# Patient Record
Sex: FEMALE | URBAN - METROPOLITAN AREA
[De-identification: names, ages, dates, MRNs, and addresses within clinical notes are randomized per-mention and may not be internally consistent; named-entity substitution may affect disease eponyms.]

---

## 2018-01-17 ENCOUNTER — IMPORTED ENCOUNTER (OUTPATIENT)
Dept: URBAN - METROPOLITAN AREA CLINIC 59 | Facility: CLINIC | Age: 58
End: 2018-01-17

## 2018-01-17 PROBLEM — H02.534 EYELID RETRACTION OF LEFT UPPER EYELID: Noted: 2018-01-17

## 2018-01-17 PROBLEM — H05.89 OTHER DISORDERS OF ORBIT: Noted: 2018-01-17

## 2018-01-17 PROBLEM — H02.531 EYELID RETRACTION RIGHT UPPER EYELID: Noted: 2018-01-17

## 2018-01-17 PROBLEM — H04.123 TEAR FILM INSUFFICIENCY OF BILATERAL LACRIMAL GLANDS: Noted: 2018-01-17

## 2018-01-17 PROCEDURE — 99204 OFFICE O/P NEW MOD 45 MIN: CPT

## 2020-07-23 ENCOUNTER — APPOINTMENT (RX ONLY)
Dept: URBAN - METROPOLITAN AREA CLINIC 348 | Facility: CLINIC | Age: 60
Setting detail: DERMATOLOGY
End: 2020-07-23

## 2020-07-23 DIAGNOSIS — D22 MELANOCYTIC NEVI: ICD-10-CM

## 2020-07-23 DIAGNOSIS — Z80.8 FAMILY HISTORY OF MALIGNANT NEOPLASM OF OTHER ORGANS OR SYSTEMS: ICD-10-CM

## 2020-07-23 DIAGNOSIS — H01.13 ECZEMATOUS DERMATITIS OF EYELID: ICD-10-CM

## 2020-07-23 DIAGNOSIS — L82.1 OTHER SEBORRHEIC KERATOSIS: ICD-10-CM

## 2020-07-23 DIAGNOSIS — D18.0 HEMANGIOMA: ICD-10-CM

## 2020-07-23 DIAGNOSIS — L57.0 ACTINIC KERATOSIS: ICD-10-CM

## 2020-07-23 DIAGNOSIS — L81.4 OTHER MELANIN HYPERPIGMENTATION: ICD-10-CM

## 2020-07-23 PROBLEM — H01.134 ECZEMATOUS DERMATITIS OF LEFT UPPER EYELID: Status: ACTIVE | Noted: 2020-07-23

## 2020-07-23 PROBLEM — D22.5 MELANOCYTIC NEVI OF TRUNK: Status: ACTIVE | Noted: 2020-07-23

## 2020-07-23 PROBLEM — H01.139 ECZEMATOUS DERMATITIS OF UNSPECIFIED EYE, UNSPECIFIED EYELID: Status: ACTIVE | Noted: 2020-07-23

## 2020-07-23 PROBLEM — D18.01 HEMANGIOMA OF SKIN AND SUBCUTANEOUS TISSUE: Status: ACTIVE | Noted: 2020-07-23

## 2020-07-23 PROCEDURE — 99203 OFFICE O/P NEW LOW 30 MIN: CPT | Mod: 25

## 2020-07-23 PROCEDURE — 17003 DESTRUCT PREMALG LES 2-14: CPT

## 2020-07-23 PROCEDURE — ? TREATMENT REGIMEN

## 2020-07-23 PROCEDURE — 17000 DESTRUCT PREMALG LESION: CPT

## 2020-07-23 PROCEDURE — ? COUNSELING

## 2020-07-23 PROCEDURE — ? LIQUID NITROGEN

## 2020-07-23 ASSESSMENT — LOCATION SIMPLE DESCRIPTION DERM
LOCATION SIMPLE: RIGHT UPPER BACK
LOCATION SIMPLE: LEFT EYEBROW
LOCATION SIMPLE: RIGHT FOREHEAD
LOCATION SIMPLE: RIGHT FOREARM
LOCATION SIMPLE: LEFT SUPERIOR EYELID
LOCATION SIMPLE: ABDOMEN
LOCATION SIMPLE: LEFT LIP
LOCATION SIMPLE: RIGHT EYEBROW
LOCATION SIMPLE: LEFT FOREARM

## 2020-07-23 ASSESSMENT — LOCATION DETAILED DESCRIPTION DERM
LOCATION DETAILED: RIGHT INFERIOR MEDIAL FOREHEAD
LOCATION DETAILED: LEFT CENTRAL EYEBROW
LOCATION DETAILED: LEFT UPPER CUTANEOUS LIP
LOCATION DETAILED: LEFT LATERAL SUPERIOR EYELID
LOCATION DETAILED: EPIGASTRIC SKIN
LOCATION DETAILED: PERIUMBILICAL SKIN
LOCATION DETAILED: RIGHT DISTAL DORSAL FOREARM
LOCATION DETAILED: LEFT PROXIMAL DORSAL FOREARM
LOCATION DETAILED: RIGHT CENTRAL EYEBROW
LOCATION DETAILED: RIGHT SUPERIOR MEDIAL UPPER BACK
LOCATION DETAILED: RIGHT INFERIOR MEDIAL UPPER BACK

## 2020-07-23 ASSESSMENT — LOCATION ZONE DERM
LOCATION ZONE: TRUNK
LOCATION ZONE: LIP
LOCATION ZONE: FACE
LOCATION ZONE: EYELID
LOCATION ZONE: ARM

## 2020-07-23 NOTE — PROCEDURE: LIQUID NITROGEN
Duration Of Freeze Thaw-Cycle (Seconds): 2
Detail Level: Detailed
Render Note In Bullet Format When Appropriate: No
Number Of Freeze-Thaw Cycles: 1 freeze-thaw cycle
Consent: The patient's consent was obtained including but not limited to risks of crusting, scabbing, blistering, scarring, darker or lighter pigmentary change, recurrence, incomplete removal and infection.
Render Post-Care Instructions In Note?: yes
Post-Care Instructions: I reviewed with the patient in detail post-care instructions. Patient is to wear sunprotection, and avoid picking at any of the treated lesions. Pt may apply Vaseline or Aquaphor to crusted or scabbing areas.

## 2020-10-14 ENCOUNTER — APPOINTMENT (RX ONLY)
Dept: URBAN - METROPOLITAN AREA CLINIC 348 | Facility: CLINIC | Age: 60
Setting detail: DERMATOLOGY
End: 2020-10-14

## 2020-10-14 DIAGNOSIS — L82.1 OTHER SEBORRHEIC KERATOSIS: ICD-10-CM

## 2020-10-14 DIAGNOSIS — L82.0 INFLAMED SEBORRHEIC KERATOSIS: ICD-10-CM

## 2020-10-14 DIAGNOSIS — L57.0 ACTINIC KERATOSIS: ICD-10-CM

## 2020-10-14 PROCEDURE — 17110 DESTRUCTION B9 LES UP TO 14: CPT

## 2020-10-14 PROCEDURE — 99213 OFFICE O/P EST LOW 20 MIN: CPT | Mod: 25

## 2020-10-14 PROCEDURE — ? LIQUID NITROGEN

## 2020-10-14 PROCEDURE — 17003 DESTRUCT PREMALG LES 2-14: CPT | Mod: 59

## 2020-10-14 PROCEDURE — ? COUNSELING

## 2020-10-14 PROCEDURE — 17000 DESTRUCT PREMALG LESION: CPT | Mod: 59

## 2020-10-14 ASSESSMENT — LOCATION DETAILED DESCRIPTION DERM
LOCATION DETAILED: LEFT UPPER CUTANEOUS LIP
LOCATION DETAILED: RIGHT LATERAL FOREHEAD

## 2020-10-14 ASSESSMENT — LOCATION SIMPLE DESCRIPTION DERM
LOCATION SIMPLE: LEFT LIP
LOCATION SIMPLE: RIGHT FOREHEAD

## 2020-10-14 ASSESSMENT — LOCATION ZONE DERM
LOCATION ZONE: LIP
LOCATION ZONE: FACE

## 2020-10-14 NOTE — PROCEDURE: LIQUID NITROGEN
Post-Care Instructions: I reviewed with the patient in detail post-care instructions. Patient is to wear sunprotection, and avoid picking at any of the treated lesions. Pt may apply Vaseline to crusted or scabbing areas.
Render Post-Care Instructions In Note?: no
Consent: The patient's consent was obtained including but not limited to risks of crusting, scabbing, blistering, scarring, darker or lighter pigmentary change, recurrence, incomplete removal and infection.
Number Of Freeze-Thaw Cycles: 1 freeze-thaw cycle
Duration Of Freeze Thaw-Cycle (Seconds): 10
Medical Necessity Clause: This procedure was medically necessary because the lesions that were treated were:
Medical Necessity Information: It is in your best interest to select a reason for this procedure from the list below. All of these items fulfill various CMS LCD requirements except the new and changing color options.
Detail Level: Detailed

## 2020-10-20 ENCOUNTER — RX ONLY (OUTPATIENT)
Age: 60
Setting detail: RX ONLY
End: 2020-10-20

## 2020-10-20 RX ORDER — VALACYCLOVIR 1 G/1
TABLET, FILM COATED ORAL
Qty: 4 | Refills: 1 | Status: ERX | COMMUNITY
Start: 2020-10-20

## 2020-10-30 ENCOUNTER — RX ONLY (OUTPATIENT)
Age: 60
Setting detail: RX ONLY
End: 2020-10-30

## 2020-10-30 RX ORDER — VALACYCLOVIR 1 G/1
TABLET, FILM COATED ORAL
Qty: 4 | Refills: 1 | Status: ERX

## 2021-05-18 ENCOUNTER — APPOINTMENT (RX ONLY)
Dept: URBAN - METROPOLITAN AREA CLINIC 348 | Facility: CLINIC | Age: 61
Setting detail: DERMATOLOGY
End: 2021-05-18

## 2021-05-18 DIAGNOSIS — Z80.8 FAMILY HISTORY OF MALIGNANT NEOPLASM OF OTHER ORGANS OR SYSTEMS: ICD-10-CM

## 2021-05-18 DIAGNOSIS — D18.0 HEMANGIOMA: ICD-10-CM

## 2021-05-18 DIAGNOSIS — L82.0 INFLAMED SEBORRHEIC KERATOSIS: ICD-10-CM

## 2021-05-18 DIAGNOSIS — L82.1 OTHER SEBORRHEIC KERATOSIS: ICD-10-CM

## 2021-05-18 DIAGNOSIS — L81.4 OTHER MELANIN HYPERPIGMENTATION: ICD-10-CM

## 2021-05-18 DIAGNOSIS — D22 MELANOCYTIC NEVI: ICD-10-CM

## 2021-05-18 DIAGNOSIS — D69.2 OTHER NONTHROMBOCYTOPENIC PURPURA: ICD-10-CM | Status: RESOLVING

## 2021-05-18 PROBLEM — D22.5 MELANOCYTIC NEVI OF TRUNK: Status: ACTIVE | Noted: 2021-05-18

## 2021-05-18 PROBLEM — D18.01 HEMANGIOMA OF SKIN AND SUBCUTANEOUS TISSUE: Status: ACTIVE | Noted: 2021-05-18

## 2021-05-18 PROCEDURE — 17110 DESTRUCTION B9 LES UP TO 14: CPT

## 2021-05-18 PROCEDURE — 99213 OFFICE O/P EST LOW 20 MIN: CPT | Mod: 25

## 2021-05-18 PROCEDURE — ? LIQUID NITROGEN

## 2021-05-18 PROCEDURE — ? SUNSCREEN RECOMMENDATIONS

## 2021-05-18 PROCEDURE — ? COUNSELING

## 2021-05-18 ASSESSMENT — LOCATION ZONE DERM
LOCATION ZONE: LEG
LOCATION ZONE: TRUNK
LOCATION ZONE: ARM

## 2021-05-18 ASSESSMENT — LOCATION SIMPLE DESCRIPTION DERM
LOCATION SIMPLE: CHEST
LOCATION SIMPLE: ABDOMEN
LOCATION SIMPLE: LEFT FOREARM
LOCATION SIMPLE: RIGHT UPPER BACK
LOCATION SIMPLE: RIGHT FOREARM
LOCATION SIMPLE: RIGHT THIGH

## 2021-05-18 ASSESSMENT — LOCATION DETAILED DESCRIPTION DERM
LOCATION DETAILED: PERIUMBILICAL SKIN
LOCATION DETAILED: LEFT PROXIMAL DORSAL FOREARM
LOCATION DETAILED: RIGHT MEDIAL SUPERIOR CHEST
LOCATION DETAILED: RIGHT INFERIOR MEDIAL UPPER BACK
LOCATION DETAILED: RIGHT ANTERIOR DISTAL THIGH
LOCATION DETAILED: EPIGASTRIC SKIN
LOCATION DETAILED: RIGHT SUPERIOR MEDIAL UPPER BACK
LOCATION DETAILED: RIGHT DISTAL DORSAL FOREARM

## 2021-05-18 NOTE — PROCEDURE: LIQUID NITROGEN
Number Of Freeze-Thaw Cycles: 2 freeze-thaw cycles
Render Post-Care Instructions In Note?: no
Post-Care Instructions: I reviewed with the patient in detail post-care instructions. Patient is to wear sunprotection, and avoid picking at any of the treated lesions. Pt may apply Vaseline to crusted or scabbing areas.
Detail Level: Detailed
Medical Necessity Information: It is in your best interest to select a reason for this procedure from the list below. All of these items fulfill various CMS LCD requirements except the new and changing color options.
Consent: The patient's consent was obtained including but not limited to risks of crusting, scabbing, blistering, scarring, darker or lighter pigmentary change, recurrence, incomplete removal and infection.
Medical Necessity Clause: This procedure was medically necessary because the lesions that were treated were:

## 2022-06-28 ENCOUNTER — APPOINTMENT (RX ONLY)
Dept: URBAN - METROPOLITAN AREA CLINIC 341 | Facility: CLINIC | Age: 62
Setting detail: DERMATOLOGY
End: 2022-06-28

## 2022-06-28 DIAGNOSIS — B07.8 OTHER VIRAL WARTS: ICD-10-CM

## 2022-06-28 DIAGNOSIS — L81.4 OTHER MELANIN HYPERPIGMENTATION: ICD-10-CM

## 2022-06-28 DIAGNOSIS — R23.3 SPONTANEOUS ECCHYMOSES: ICD-10-CM | Status: INADEQUATELY CONTROLLED

## 2022-06-28 DIAGNOSIS — L57.0 ACTINIC KERATOSIS: ICD-10-CM

## 2022-06-28 DIAGNOSIS — D22 MELANOCYTIC NEVI: ICD-10-CM

## 2022-06-28 DIAGNOSIS — L82.1 OTHER SEBORRHEIC KERATOSIS: ICD-10-CM

## 2022-06-28 DIAGNOSIS — D18.0 HEMANGIOMA: ICD-10-CM

## 2022-06-28 DIAGNOSIS — L85.2 KERATOSIS PUNCTATA (PALMARIS ET PLANTARIS): ICD-10-CM

## 2022-06-28 DIAGNOSIS — Z80.8 FAMILY HISTORY OF MALIGNANT NEOPLASM OF OTHER ORGANS OR SYSTEMS: ICD-10-CM

## 2022-06-28 PROBLEM — D22.5 MELANOCYTIC NEVI OF TRUNK: Status: ACTIVE | Noted: 2022-06-28

## 2022-06-28 PROBLEM — D18.01 HEMANGIOMA OF SKIN AND SUBCUTANEOUS TISSUE: Status: ACTIVE | Noted: 2022-06-28

## 2022-06-28 PROCEDURE — 99213 OFFICE O/P EST LOW 20 MIN: CPT | Mod: 25

## 2022-06-28 PROCEDURE — ? OTC TREATMENT REGIMEN

## 2022-06-28 PROCEDURE — ? LIQUID NITROGEN

## 2022-06-28 PROCEDURE — 17003 DESTRUCT PREMALG LES 2-14: CPT

## 2022-06-28 PROCEDURE — ? COUNSELING

## 2022-06-28 PROCEDURE — ? TREATMENT REGIMEN

## 2022-06-28 PROCEDURE — 17000 DESTRUCT PREMALG LESION: CPT

## 2022-06-28 PROCEDURE — ? SUNSCREEN RECOMMENDATIONS

## 2022-06-28 ASSESSMENT — LOCATION SIMPLE DESCRIPTION DERM
LOCATION SIMPLE: ABDOMEN
LOCATION SIMPLE: CHEST
LOCATION SIMPLE: LEFT FOREARM
LOCATION SIMPLE: RIGHT POSTERIOR UPPER ARM
LOCATION SIMPLE: RIGHT UPPER BACK
LOCATION SIMPLE: RIGHT PLANTAR SURFACE
LOCATION SIMPLE: RIGHT FOREARM

## 2022-06-28 ASSESSMENT — LOCATION ZONE DERM
LOCATION ZONE: TRUNK
LOCATION ZONE: ARM
LOCATION ZONE: FEET

## 2022-06-28 ASSESSMENT — LOCATION DETAILED DESCRIPTION DERM
LOCATION DETAILED: EPIGASTRIC SKIN
LOCATION DETAILED: PERIUMBILICAL SKIN
LOCATION DETAILED: UPPER STERNUM
LOCATION DETAILED: RIGHT DISTAL DORSAL FOREARM
LOCATION DETAILED: RIGHT DISTAL POSTERIOR UPPER ARM
LOCATION DETAILED: RIGHT INFERIOR MEDIAL UPPER BACK
LOCATION DETAILED: LEFT PROXIMAL DORSAL FOREARM
LOCATION DETAILED: RIGHT SUPERIOR MEDIAL UPPER BACK
LOCATION DETAILED: RIGHT INSTEP
LOCATION DETAILED: MIDDLE STERNUM

## 2022-06-28 NOTE — PROCEDURE: OTC TREATMENT REGIMEN
Detail Level: Zone
Patient Specific Otc Recommendations (Will Not Stick From Patient To Patient): Compound W , cover with tape and remove in the morning x 1 month

## 2022-09-27 ENCOUNTER — APPOINTMENT (RX ONLY)
Dept: URBAN - METROPOLITAN AREA CLINIC 341 | Facility: CLINIC | Age: 62
Setting detail: DERMATOLOGY
End: 2022-09-27

## 2022-09-27 DIAGNOSIS — L85.2 KERATOSIS PUNCTATA (PALMARIS ET PLANTARIS): ICD-10-CM | Status: STABLE

## 2022-09-27 DIAGNOSIS — L57.0 ACTINIC KERATOSIS: ICD-10-CM | Status: INADEQUATELY CONTROLLED

## 2022-09-27 DIAGNOSIS — L82.1 OTHER SEBORRHEIC KERATOSIS: ICD-10-CM | Status: STABLE

## 2022-09-27 DIAGNOSIS — D22 MELANOCYTIC NEVI: ICD-10-CM | Status: STABLE

## 2022-09-27 PROBLEM — D22.5 MELANOCYTIC NEVI OF TRUNK: Status: ACTIVE | Noted: 2022-09-27

## 2022-09-27 PROCEDURE — ? OTC TREATMENT REGIMEN

## 2022-09-27 PROCEDURE — ? LIQUID NITROGEN

## 2022-09-27 PROCEDURE — 17000 DESTRUCT PREMALG LESION: CPT

## 2022-09-27 PROCEDURE — 99213 OFFICE O/P EST LOW 20 MIN: CPT | Mod: 25

## 2022-09-27 PROCEDURE — ? COUNSELING

## 2022-09-27 PROCEDURE — 17003 DESTRUCT PREMALG LES 2-14: CPT

## 2022-09-27 ASSESSMENT — LOCATION SIMPLE DESCRIPTION DERM
LOCATION SIMPLE: RIGHT PLANTAR SURFACE
LOCATION SIMPLE: LEFT UPPER BACK
LOCATION SIMPLE: RIGHT FOREHEAD
LOCATION SIMPLE: LEFT CHEEK
LOCATION SIMPLE: LEFT LIP

## 2022-09-27 ASSESSMENT — LOCATION DETAILED DESCRIPTION DERM
LOCATION DETAILED: RIGHT FOREHEAD
LOCATION DETAILED: LEFT SUPERIOR UPPER BACK
LOCATION DETAILED: RIGHT MEDIAL PLANTAR MIDFOOT
LOCATION DETAILED: LEFT SUPERIOR LATERAL MALAR CHEEK
LOCATION DETAILED: LEFT UPPER CUTANEOUS LIP

## 2022-09-27 ASSESSMENT — LOCATION ZONE DERM
LOCATION ZONE: LIP
LOCATION ZONE: FEET
LOCATION ZONE: TRUNK
LOCATION ZONE: FACE

## 2022-09-27 NOTE — PROCEDURE: OTC TREATMENT REGIMEN
Detail Level: Zone
Patient Specific Otc Recommendations (Will Not Stick From Patient To Patient): Recommended SA liquid with a bandaid

## 2022-12-20 ENCOUNTER — RX ONLY (OUTPATIENT)
Age: 62
Setting detail: RX ONLY
End: 2022-12-20

## 2023-03-28 ENCOUNTER — APPOINTMENT (RX ONLY)
Dept: URBAN - METROPOLITAN AREA CLINIC 124 | Facility: CLINIC | Age: 63
Setting detail: DERMATOLOGY
End: 2023-03-28

## 2023-03-28 DIAGNOSIS — B07.0 PLANTAR WART: ICD-10-CM

## 2023-03-28 DIAGNOSIS — D22 MELANOCYTIC NEVI: ICD-10-CM

## 2023-03-28 DIAGNOSIS — Z71.89 OTHER SPECIFIED COUNSELING: ICD-10-CM

## 2023-03-28 DIAGNOSIS — L82.1 OTHER SEBORRHEIC KERATOSIS: ICD-10-CM

## 2023-03-28 DIAGNOSIS — L57.0 ACTINIC KERATOSIS: ICD-10-CM

## 2023-03-28 DIAGNOSIS — D18.0 HEMANGIOMA: ICD-10-CM

## 2023-03-28 DIAGNOSIS — L82.0 INFLAMED SEBORRHEIC KERATOSIS: ICD-10-CM

## 2023-03-28 DIAGNOSIS — L81.4 OTHER MELANIN HYPERPIGMENTATION: ICD-10-CM

## 2023-03-28 DIAGNOSIS — D49.2 NEOPLASM OF UNSPECIFIED BEHAVIOR OF BONE, SOFT TISSUE, AND SKIN: ICD-10-CM

## 2023-03-28 PROBLEM — D18.01 HEMANGIOMA OF SKIN AND SUBCUTANEOUS TISSUE: Status: ACTIVE | Noted: 2023-03-28

## 2023-03-28 PROBLEM — D22.5 MELANOCYTIC NEVI OF TRUNK: Status: ACTIVE | Noted: 2023-03-28

## 2023-03-28 PROCEDURE — 17000 DESTRUCT PREMALG LESION: CPT | Mod: 59

## 2023-03-28 PROCEDURE — ? BIOPSY BY SHAVE METHOD

## 2023-03-28 PROCEDURE — 11102 TANGNTL BX SKIN SINGLE LES: CPT | Mod: 59

## 2023-03-28 PROCEDURE — ? LIQUID NITROGEN

## 2023-03-28 PROCEDURE — 17003 DESTRUCT PREMALG LES 2-14: CPT | Mod: 59

## 2023-03-28 PROCEDURE — 17110 DESTRUCTION B9 LES UP TO 14: CPT

## 2023-03-28 PROCEDURE — 99203 OFFICE O/P NEW LOW 30 MIN: CPT | Mod: 25

## 2023-03-28 PROCEDURE — ? COUNSELING

## 2023-03-28 ASSESSMENT — LOCATION DETAILED DESCRIPTION DERM
LOCATION DETAILED: RIGHT INSTEP
LOCATION DETAILED: PHILTRUM
LOCATION DETAILED: RIGHT MEDIAL SUPERIOR CHEST
LOCATION DETAILED: LEFT UPPER CUTANEOUS LIP
LOCATION DETAILED: INFERIOR THORACIC SPINE
LOCATION DETAILED: RIGHT LATERAL SUPERIOR CHEST
LOCATION DETAILED: RIGHT RIB CAGE

## 2023-03-28 ASSESSMENT — LOCATION ZONE DERM
LOCATION ZONE: TRUNK
LOCATION ZONE: FEET
LOCATION ZONE: LIP

## 2023-03-28 ASSESSMENT — LOCATION SIMPLE DESCRIPTION DERM
LOCATION SIMPLE: ABDOMEN
LOCATION SIMPLE: CHEST
LOCATION SIMPLE: LEFT LIP
LOCATION SIMPLE: UPPER BACK
LOCATION SIMPLE: UPPER LIP
LOCATION SIMPLE: RIGHT PLANTAR SURFACE

## 2023-03-28 NOTE — PROCEDURE: LIQUID NITROGEN
Detail Level: Detailed
Application Tool (Optional): Liquid Nitrogen Sprayer
Post-Care Instructions: I reviewed with the patient in detail post-care instructions. Patient is to wear sunprotection, and avoid picking at any of the treated lesions. Pt may apply Vaseline to crusted or scabbing areas.
Duration Of Freeze Thaw-Cycle (Seconds): 10
Show Applicator Variable?: Yes
Render Note In Bullet Format When Appropriate: No
Number Of Freeze-Thaw Cycles: 1 freeze-thaw cycle
Consent: The patient's consent was obtained including but not limited to risks of crusting, scabbing, blistering, scarring, darker or lighter pigmentary change, recurrence, incomplete removal and infection.
Medical Necessity Clause: This procedure was medically necessary because the lesions that were treated were:
Medical Necessity Information: It is in your best interest to select a reason for this procedure from the list below. All of these items fulfill various CMS LCD requirements except the new and changing color options.
Spray Paint Text: The liquid nitrogen was applied to the skin utilizing a spray paint frosting technique.

## 2023-10-15 ASSESSMENT — TONOMETRY
OS_IOP_MMHG: 10
OD_IOP_MMHG: 10

## 2023-10-15 ASSESSMENT — VISUAL ACUITY
OD_CC: 20/20
OS_SC: 20/40

## 2023-12-11 ENCOUNTER — APPOINTMENT (RX ONLY)
Dept: URBAN - METROPOLITAN AREA CLINIC 341 | Facility: CLINIC | Age: 63
Setting detail: DERMATOLOGY
End: 2023-12-11

## 2023-12-11 DIAGNOSIS — D22 MELANOCYTIC NEVI: ICD-10-CM

## 2023-12-11 DIAGNOSIS — Z80.8 FAMILY HISTORY OF MALIGNANT NEOPLASM OF OTHER ORGANS OR SYSTEMS: ICD-10-CM

## 2023-12-11 DIAGNOSIS — L57.0 ACTINIC KERATOSIS: ICD-10-CM

## 2023-12-11 DIAGNOSIS — L81.4 OTHER MELANIN HYPERPIGMENTATION: ICD-10-CM

## 2023-12-11 DIAGNOSIS — D18.0 HEMANGIOMA: ICD-10-CM

## 2023-12-11 DIAGNOSIS — L82.1 OTHER SEBORRHEIC KERATOSIS: ICD-10-CM

## 2023-12-11 PROBLEM — D22.5 MELANOCYTIC NEVI OF TRUNK: Status: ACTIVE | Noted: 2023-12-11

## 2023-12-11 PROBLEM — D18.01 HEMANGIOMA OF SKIN AND SUBCUTANEOUS TISSUE: Status: ACTIVE | Noted: 2023-12-11

## 2023-12-11 PROCEDURE — ? LIQUID NITROGEN

## 2023-12-11 PROCEDURE — 17000 DESTRUCT PREMALG LESION: CPT

## 2023-12-11 PROCEDURE — 99213 OFFICE O/P EST LOW 20 MIN: CPT | Mod: 25

## 2023-12-11 PROCEDURE — ? TREATMENT REGIMEN

## 2023-12-11 PROCEDURE — ? COUNSELING

## 2023-12-11 ASSESSMENT — LOCATION DETAILED DESCRIPTION DERM
LOCATION DETAILED: EPIGASTRIC SKIN
LOCATION DETAILED: PERIUMBILICAL SKIN
LOCATION DETAILED: RIGHT INFERIOR MEDIAL MALAR CHEEK
LOCATION DETAILED: SUBXIPHOID

## 2023-12-11 ASSESSMENT — LOCATION SIMPLE DESCRIPTION DERM
LOCATION SIMPLE: RIGHT CHEEK
LOCATION SIMPLE: ABDOMEN

## 2023-12-11 ASSESSMENT — LOCATION ZONE DERM
LOCATION ZONE: FACE
LOCATION ZONE: TRUNK

## 2024-06-05 ENCOUNTER — LAB OUTSIDE AN ENCOUNTER (OUTPATIENT)
Dept: URBAN - METROPOLITAN AREA CLINIC 7 | Facility: CLINIC | Age: 64
End: 2024-06-05

## 2024-06-05 ENCOUNTER — TELEPHONE ENCOUNTER (OUTPATIENT)
Dept: URBAN - METROPOLITAN AREA CLINIC 7 | Facility: CLINIC | Age: 64
End: 2024-06-05

## 2024-07-09 ENCOUNTER — OFFICE VISIT (OUTPATIENT)
Dept: URBAN - METROPOLITAN AREA SURGERY CENTER 5 | Facility: SURGERY CENTER | Age: 64
End: 2024-07-09
Payer: COMMERCIAL

## 2024-07-09 DIAGNOSIS — Z80.0 FAMILY HISTORY OF COLON CANCER: ICD-10-CM

## 2024-07-09 DIAGNOSIS — K64.8 OTHER HEMORRHOIDS: ICD-10-CM

## 2024-07-09 DIAGNOSIS — Z12.11 ENCOUNTER FOR SCREENING FOR MALIGNANT NEOPLASM OF COLON: ICD-10-CM

## 2024-07-09 DIAGNOSIS — Q43.8 TORTUOUS COLON: ICD-10-CM

## 2024-07-09 DIAGNOSIS — Q43.8 OTHER SPECIFIED CONGENITAL MALFORMATIONS OF INTESTINE: ICD-10-CM

## 2024-07-09 DIAGNOSIS — Z80.0 FAMILY HISTORY OF MALIGNANT NEOPLASM OF DIGESTIVE ORGANS: ICD-10-CM

## 2024-07-09 DIAGNOSIS — Z12.11 ENCOUNTER SCREENING FOR MALIGNANT NEOPLASM OF COLON: ICD-10-CM

## 2024-07-09 PROCEDURE — G0105 COLORECTAL SCRN; HI RISK IND: HCPCS | Performed by: INTERNAL MEDICINE

## 2024-07-09 PROCEDURE — 00812 ANES LWR INTST SCR COLSC: CPT | Performed by: NURSE ANESTHETIST, CERTIFIED REGISTERED

## 2024-09-25 ENCOUNTER — APPOINTMENT (RX ONLY)
Dept: URBAN - METROPOLITAN AREA CLINIC 341 | Facility: CLINIC | Age: 64
Setting detail: DERMATOLOGY
End: 2024-09-25

## 2024-09-25 DIAGNOSIS — D18.0 HEMANGIOMA: ICD-10-CM

## 2024-09-25 DIAGNOSIS — Z80.8 FAMILY HISTORY OF MALIGNANT NEOPLASM OF OTHER ORGANS OR SYSTEMS: ICD-10-CM

## 2024-09-25 DIAGNOSIS — D22 MELANOCYTIC NEVI: ICD-10-CM

## 2024-09-25 DIAGNOSIS — L82.1 OTHER SEBORRHEIC KERATOSIS: ICD-10-CM

## 2024-09-25 DIAGNOSIS — L82.0 INFLAMED SEBORRHEIC KERATOSIS: ICD-10-CM | Status: INADEQUATELY CONTROLLED

## 2024-09-25 DIAGNOSIS — L81.4 OTHER MELANIN HYPERPIGMENTATION: ICD-10-CM

## 2024-09-25 PROBLEM — D22.5 MELANOCYTIC NEVI OF TRUNK: Status: ACTIVE | Noted: 2024-09-25

## 2024-09-25 PROBLEM — D18.01 HEMANGIOMA OF SKIN AND SUBCUTANEOUS TISSUE: Status: ACTIVE | Noted: 2024-09-25

## 2024-09-25 PROCEDURE — 99213 OFFICE O/P EST LOW 20 MIN: CPT | Mod: 25

## 2024-09-25 PROCEDURE — ? FULL BODY SKIN EXAM

## 2024-09-25 PROCEDURE — ? COUNSELING

## 2024-09-25 PROCEDURE — ? LIQUID NITROGEN

## 2024-09-25 PROCEDURE — 17110 DESTRUCTION B9 LES UP TO 14: CPT

## 2024-09-25 PROCEDURE — ? TREATMENT REGIMEN

## 2024-09-25 ASSESSMENT — LOCATION ZONE DERM
LOCATION ZONE: FACE
LOCATION ZONE: TRUNK

## 2024-09-25 ASSESSMENT — LOCATION DETAILED DESCRIPTION DERM
LOCATION DETAILED: LEFT SUPERIOR LATERAL FOREHEAD
LOCATION DETAILED: RIGHT SUPERIOR UPPER BACK
LOCATION DETAILED: LEFT SUPERIOR FOREHEAD
LOCATION DETAILED: RIGHT MID-UPPER BACK
LOCATION DETAILED: RIGHT SUPERIOR LATERAL FOREHEAD

## 2024-09-25 ASSESSMENT — LOCATION SIMPLE DESCRIPTION DERM
LOCATION SIMPLE: RIGHT UPPER BACK
LOCATION SIMPLE: LEFT FOREHEAD
LOCATION SIMPLE: RIGHT FOREHEAD

## 2024-09-25 NOTE — PROCEDURE: LIQUID NITROGEN
Duration Of Freeze Thaw-Cycle (Seconds): 5-10
Number Of Freeze-Thaw Cycles: 1 freeze-thaw cycle
Post-Care Instructions: I reviewed with the patient in detail post-care instructions. Patient is to wear sunprotection, and avoid picking at any of the treated lesions. Pt may apply Vaseline to crusted or scabbing areas.
Show Aperture Variable?: Yes
Application Tool (Optional): Cry-AC
Detail Level: Detailed
Spray Paint Text: The liquid nitrogen was applied to the skin utilizing a spray paint frosting technique.
Include Z78.9 (Other Specified Conditions Influencing Health Status) As An Associated Diagnosis?: No
Medical Necessity Clause: This procedure was medically necessary because the lesions that were treated were:
Medical Necessity Information: It is in your best interest to select a reason for this procedure from the list below. All of these items fulfill various CMS LCD requirements except the new and changing color options.
Consent: The patient's consent was obtained including but not limited to risks of crusting, scabbing, blistering, scarring, darker or lighter pigmentary change, recurrence, incomplete removal and infection.

## 2025-05-08 ENCOUNTER — APPOINTMENT (OUTPATIENT)
Dept: URBAN - METROPOLITAN AREA CLINIC 121 | Facility: CLINIC | Age: 65
Setting detail: DERMATOLOGY
End: 2025-05-08

## 2025-05-08 DIAGNOSIS — L82.1 OTHER SEBORRHEIC KERATOSIS: ICD-10-CM

## 2025-05-08 DIAGNOSIS — Z71.89 OTHER SPECIFIED COUNSELING: ICD-10-CM

## 2025-05-08 DIAGNOSIS — Z85.828 PERSONAL HISTORY OF OTHER MALIGNANT NEOPLASM OF SKIN: ICD-10-CM

## 2025-05-08 DIAGNOSIS — L81.4 OTHER MELANIN HYPERPIGMENTATION: ICD-10-CM

## 2025-05-08 DIAGNOSIS — L57.0 ACTINIC KERATOSIS: ICD-10-CM

## 2025-05-08 DIAGNOSIS — D18.0 HEMANGIOMA: ICD-10-CM

## 2025-05-08 DIAGNOSIS — D69.2 OTHER NONTHROMBOCYTOPENIC PURPURA: ICD-10-CM

## 2025-05-08 DIAGNOSIS — D22 MELANOCYTIC NEVI: ICD-10-CM

## 2025-05-08 PROBLEM — D22.5 MELANOCYTIC NEVI OF TRUNK: Status: ACTIVE | Noted: 2025-05-08

## 2025-05-08 PROBLEM — D18.01 HEMANGIOMA OF SKIN AND SUBCUTANEOUS TISSUE: Status: ACTIVE | Noted: 2025-05-08

## 2025-05-08 PROCEDURE — ? LIQUID NITROGEN

## 2025-05-08 PROCEDURE — ? TREATMENT REGIMEN

## 2025-05-08 PROCEDURE — 17000 DESTRUCT PREMALG LESION: CPT

## 2025-05-08 PROCEDURE — ? COUNSELING

## 2025-05-08 PROCEDURE — 99213 OFFICE O/P EST LOW 20 MIN: CPT | Mod: 25

## 2025-05-08 ASSESSMENT — LOCATION SIMPLE DESCRIPTION DERM
LOCATION SIMPLE: UPPER BACK
LOCATION SIMPLE: CHEST
LOCATION SIMPLE: LOWER BACK
LOCATION SIMPLE: ABDOMEN
LOCATION SIMPLE: LEFT UPPER BACK
LOCATION SIMPLE: LEFT ANTERIOR NECK
LOCATION SIMPLE: RIGHT BREAST

## 2025-05-08 ASSESSMENT — LOCATION DETAILED DESCRIPTION DERM
LOCATION DETAILED: SUPERIOR THORACIC SPINE
LOCATION DETAILED: LOWER STERNUM
LOCATION DETAILED: LEFT CLAVICULAR NECK
LOCATION DETAILED: MIDDLE STERNUM
LOCATION DETAILED: UPPER STERNUM
LOCATION DETAILED: INFERIOR THORACIC SPINE
LOCATION DETAILED: SUBXIPHOID
LOCATION DETAILED: LEFT LATERAL UPPER BACK
LOCATION DETAILED: RIGHT INFRAMAMMARY CREASE (INNER QUADRANT)
LOCATION DETAILED: SUPERIOR LUMBAR SPINE

## 2025-05-08 ASSESSMENT — LOCATION ZONE DERM
LOCATION ZONE: TRUNK
LOCATION ZONE: NECK

## 2025-05-08 NOTE — PROCEDURE: TREATMENT REGIMEN
Detail Level: Detailed
Plan: Discussed supplements for niacinamide and discussing with PCP. \\nWritten recommendations of arnica gel

## 2025-06-23 NOTE — PROCEDURE: BIOPSY BY SHAVE METHOD
Detail Level: Detailed
Depth Of Biopsy: dermis
Was A Bandage Applied: Yes
Size Of Lesion In Cm: 0.5
Biopsy Type: H and E
Biopsy Method: Dermablade
Anesthesia Type: 1% lidocaine with epinephrine
Additional Anesthesia Volume In Cc (Will Not Render If 0): 0
[Initial Visit] : an initial visit for
Hemostasis: Drysol
[Knee Pain] : knee pain
Wound Care: Petrolatum
Dressing: bandage
Destruction After The Procedure: No
Type Of Destruction Used: Curettage
Curettage Text: The wound bed was treated with curettage after the biopsy was performed.
Cryotherapy Text: The wound bed was treated with cryotherapy after the biopsy was performed.
Electrodesiccation Text: The wound bed was treated with electrodesiccation after the biopsy was performed.
Electrodesiccation And Curettage Text: The wound bed was treated with electrodesiccation and curettage after the biopsy was performed.
Silver Nitrate Text: The wound bed was treated with silver nitrate after the biopsy was performed.
Lab: [address] [place] [address] [place]
Consent: Written consent was obtained and risks were reviewed including but not limited to scarring, infection, bleeding, scabbing, incomplete removal, nerve damage and allergy to anesthesia.
Post-Care Instructions: I reviewed with the patient in detail post-care instructions. Patient is to keep the biopsy site dry overnight, and then apply bacitracin twice daily until healed. Patient may apply hydrogen peroxide soaks to remove any crusting.
Notification Instructions: Patient will be notified of biopsy results. However, patient instructed to call the office if not contacted within 2 weeks.
Billing Type: [employer]
Information: Selecting Yes will display possible errors in your note based on the variables you have selected. This validation is only offered as a suggestion for you. PLEASE NOTE THAT THE VALIDATION TEXT WILL BE REMOVED WHEN YOU FINALIZE YOUR NOTE. IF YOU WANT TO FAX A PRELIMINARY NOTE YOU WILL NEED TO TOGGLE THIS TO 'NO' IF YOU DO NOT WANT IT IN YOUR FAXED NOTE.